# Patient Record
Sex: MALE | HISPANIC OR LATINO | URBAN - METROPOLITAN AREA
[De-identification: names, ages, dates, MRNs, and addresses within clinical notes are randomized per-mention and may not be internally consistent; named-entity substitution may affect disease eponyms.]

---

## 2017-03-01 ENCOUNTER — NEW PATIENT (OUTPATIENT)
Dept: URBAN - METROPOLITAN AREA CLINIC 24 | Facility: CLINIC | Age: 62
End: 2017-03-01
Payer: MEDICARE

## 2017-03-01 DIAGNOSIS — H25.813 COMBINED FORMS OF AGE-RELATED CATARACT, BILATERAL: ICD-10-CM

## 2017-03-01 DIAGNOSIS — E11.9 TYPE 2 DIABETES MELLITUS WITHOUT COMPLICATIONS: Primary | ICD-10-CM

## 2017-03-01 PROCEDURE — 92250 FUNDUS PHOTOGRAPHY W/I&R: CPT | Performed by: OPTOMETRIST

## 2017-03-01 PROCEDURE — 92004 COMPRE OPH EXAM NEW PT 1/>: CPT | Performed by: OPTOMETRIST

## 2017-03-01 ASSESSMENT — VISUAL ACUITY
OD: 20/20
OS: 20/20

## 2017-03-01 ASSESSMENT — KERATOMETRY
OS: 43.44
OD: 43.08

## 2017-03-01 ASSESSMENT — INTRAOCULAR PRESSURE
OS: 14
OD: 13

## 2018-05-24 ENCOUNTER — FOLLOW UP ESTABLISHED (OUTPATIENT)
Dept: URBAN - METROPOLITAN AREA CLINIC 24 | Facility: CLINIC | Age: 63
End: 2018-05-24
Payer: MEDICARE

## 2018-05-24 PROCEDURE — 92014 COMPRE OPH EXAM EST PT 1/>: CPT | Performed by: OPTOMETRIST

## 2018-05-24 PROCEDURE — 92250 FUNDUS PHOTOGRAPHY W/I&R: CPT | Performed by: OPTOMETRIST

## 2018-05-24 ASSESSMENT — KERATOMETRY
OS: 43.44
OD: 43.30

## 2018-05-24 ASSESSMENT — VISUAL ACUITY
OD: 20/20
OS: 20/20

## 2018-05-24 ASSESSMENT — INTRAOCULAR PRESSURE
OD: 14
OS: 14

## 2019-07-03 ENCOUNTER — FOLLOW UP ESTABLISHED (OUTPATIENT)
Dept: URBAN - METROPOLITAN AREA CLINIC 24 | Facility: CLINIC | Age: 64
End: 2019-07-03
Payer: MEDICARE

## 2019-07-03 PROCEDURE — 92014 COMPRE OPH EXAM EST PT 1/>: CPT | Performed by: OPTOMETRIST

## 2019-07-03 PROCEDURE — 92134 CPTRZ OPH DX IMG PST SGM RTA: CPT | Performed by: OPTOMETRIST

## 2019-07-03 ASSESSMENT — VISUAL ACUITY
OD: 20/20
OS: 20/20

## 2019-07-03 ASSESSMENT — INTRAOCULAR PRESSURE
OD: 15
OS: 14

## 2020-07-06 ENCOUNTER — FOLLOW UP ESTABLISHED (OUTPATIENT)
Dept: URBAN - METROPOLITAN AREA CLINIC 24 | Facility: CLINIC | Age: 65
End: 2020-07-06
Payer: MEDICARE

## 2020-07-06 DIAGNOSIS — H43.393 OTHER VITREOUS OPACITIES, BILATERAL: ICD-10-CM

## 2020-07-06 DIAGNOSIS — Z79.4 LONG TERM (CURRENT) USE OF INSULIN: ICD-10-CM

## 2020-07-06 PROCEDURE — 92014 COMPRE OPH EXAM EST PT 1/>: CPT | Performed by: OPTOMETRIST

## 2020-07-06 PROCEDURE — 92134 CPTRZ OPH DX IMG PST SGM RTA: CPT | Performed by: OPTOMETRIST

## 2020-07-06 ASSESSMENT — VISUAL ACUITY
OD: 20/20
OS: 20/20

## 2020-07-06 ASSESSMENT — KERATOMETRY: OD: 42.91

## 2020-07-06 ASSESSMENT — INTRAOCULAR PRESSURE
OS: 15
OD: 15

## 2021-07-12 ENCOUNTER — OFFICE VISIT (OUTPATIENT)
Dept: URBAN - METROPOLITAN AREA CLINIC 24 | Facility: CLINIC | Age: 66
End: 2021-07-12
Payer: MEDICARE

## 2021-07-12 PROCEDURE — 92014 COMPRE OPH EXAM EST PT 1/>: CPT | Performed by: OPTOMETRIST

## 2021-07-12 ASSESSMENT — KERATOMETRY
OD: 43.11
OS: 43.47

## 2021-07-12 ASSESSMENT — VISUAL ACUITY
OS: 20/20
OD: 20/20

## 2021-07-12 ASSESSMENT — INTRAOCULAR PRESSURE
OD: 15
OS: 14

## 2021-07-12 NOTE — IMPRESSION/PLAN
Impression: Other vitreous opacities, bilateral Plan: Warning signs of retinal tear and detachment discussed with patient. To return to clinic STAT if any change in symptoms consistent with RT or RD.

## 2022-02-15 ENCOUNTER — OFFICE VISIT (OUTPATIENT)
Dept: URBAN - METROPOLITAN AREA CLINIC 24 | Facility: CLINIC | Age: 67
End: 2022-02-15
Payer: MEDICARE

## 2022-02-15 DIAGNOSIS — H20.012 PRIMARY IRIDOCYCLITIS, LEFT EYE: Primary | ICD-10-CM

## 2022-02-15 PROCEDURE — 99214 OFFICE O/P EST MOD 30 MIN: CPT | Performed by: OPTOMETRIST

## 2022-02-15 RX ORDER — CYCLOPENTOLATE HYDROCHLORIDE 20 MG/ML
2 % SOLUTION/ DROPS OPHTHALMIC
Qty: 5 | Refills: 1 | Status: INACTIVE
Start: 2022-02-15 | End: 2022-02-15

## 2022-02-15 RX ORDER — PREDNISOLONE ACETATE 10 MG/ML
1 % SUSPENSION/ DROPS OPHTHALMIC
Qty: 5 | Refills: 2 | Status: ACTIVE
Start: 2022-02-15

## 2022-02-15 ASSESSMENT — INTRAOCULAR PRESSURE
OS: 13
OD: 16

## 2022-02-15 NOTE — IMPRESSION/PLAN
Impression: Primary iridocyclitis, left eye: H20.012.
-- onset ~ 10 days; idiopathic.  first known episode Plan: Start pred acetate 1% qid os, cyclogel 2% bid os. 1gtt in office today See for f/u 1 week; immediately if s/sx worsen.

## 2022-02-25 ENCOUNTER — OFFICE VISIT (OUTPATIENT)
Dept: URBAN - METROPOLITAN AREA CLINIC 24 | Facility: CLINIC | Age: 67
End: 2022-02-25
Payer: MEDICARE

## 2022-02-25 PROCEDURE — 99213 OFFICE O/P EST LOW 20 MIN: CPT | Performed by: OPTOMETRIST

## 2022-02-25 ASSESSMENT — INTRAOCULAR PRESSURE
OS: 10
OD: 13

## 2022-02-25 NOTE — IMPRESSION/PLAN
Impression: Primary iridocyclitis, left eye: H20.012.
-- onset ~ 17 days; idiopathic.  first known episode Plan: Improved. Start pred acetate OD taper; use tid x 1 week, then taper weekly. Notify clinic if symptoms recur.

## 2022-02-25 NOTE — IMPRESSION/PLAN
Impression: Posterior synechiae (iris), left eye: H21.542. Plan: Unable to fill cyclopentolate. Synechiae resolved. Good IOP. Defer cycloplegic at this point.

## 2022-04-07 ENCOUNTER — OFFICE VISIT (OUTPATIENT)
Dept: URBAN - METROPOLITAN AREA CLINIC 24 | Facility: CLINIC | Age: 67
End: 2022-04-07
Payer: MEDICARE

## 2022-04-07 DIAGNOSIS — H21.542 POSTERIOR SYNECHIAE (IRIS), LEFT EYE: ICD-10-CM

## 2022-04-07 PROCEDURE — 99213 OFFICE O/P EST LOW 20 MIN: CPT | Performed by: OPTOMETRIST

## 2022-04-07 ASSESSMENT — INTRAOCULAR PRESSURE
OS: 10
OD: 13

## 2022-04-07 NOTE — IMPRESSION/PLAN
Impression: Primary iridocyclitis, left eye: H20.012.
-- onset ~ 2 months. idiopathic.  first known episode, non-granulomateous Plan: Improved. Continue pred acetate OS taper; use qam x 2 weeks; then d/c. Notify clinic if symptoms recur.

## 2022-04-07 NOTE — IMPRESSION/PLAN
Impression: Other vitreous opacities, bilateral: H43.393. Plan: There is no evidence of retinal pathology. All signs and risks of retinal detachment or tears were discussed in detail. If pt. notices any symptoms discussed, contact office ASAP. Recommend pt. return for normal recall.

## 2022-08-03 ENCOUNTER — OFFICE VISIT (OUTPATIENT)
Dept: URBAN - METROPOLITAN AREA CLINIC 24 | Facility: CLINIC | Age: 67
End: 2022-08-03
Payer: MEDICARE

## 2022-08-03 DIAGNOSIS — Z79.4 LONG TERM (CURRENT) USE OF INSULIN: ICD-10-CM

## 2022-08-03 DIAGNOSIS — E11.9 TYPE 2 DIABETES MELLITUS WITHOUT COMPLICATIONS: ICD-10-CM

## 2022-08-03 DIAGNOSIS — H20.013 PRIMARY IRIDOCYCLITIS, BILATERAL: ICD-10-CM

## 2022-08-03 DIAGNOSIS — H25.813 COMBINED FORMS OF AGE-RELATED CATARACT, BILATERAL: Primary | ICD-10-CM

## 2022-08-03 PROCEDURE — 99204 OFFICE O/P NEW MOD 45 MIN: CPT | Performed by: OPHTHALMOLOGY

## 2022-08-03 ASSESSMENT — VISUAL ACUITY
OD: 20/25
OS: 20/30

## 2022-08-03 ASSESSMENT — KERATOMETRY
OS: 43.58
OD: 43.35

## 2022-08-03 ASSESSMENT — INTRAOCULAR PRESSURE
OD: 6
OS: 9

## 2022-08-03 NOTE — IMPRESSION/PLAN
Impression: Type 2 diabetes mellitus without complications: Y01.6. Plan: Discussed good blood sugar and blood pressure - diet, exercise, and nutritional control emphasized to reduce future risk of diabetic complications. Maintain follow up with PCP.

## 2022-08-03 NOTE — IMPRESSION/PLAN
Impression: Combined forms of age-related cataract, bilateral: H25.813. Plan: Patient will continue to monitor vision. Patient will return for CEE if notes any sudden changes in vision or loss of vision.

## 2022-08-03 NOTE — IMPRESSION/PLAN
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 Plan: Improved OU. 
RTC in 1 month to see Dr. Kalyan Cr

## 2022-09-15 ENCOUNTER — OFFICE VISIT (OUTPATIENT)
Dept: URBAN - METROPOLITAN AREA CLINIC 30 | Facility: CLINIC | Age: 67
End: 2022-09-15
Payer: MEDICARE

## 2022-09-15 DIAGNOSIS — H20.013 PRIMARY IRIDOCYCLITIS, BILATERAL: Primary | ICD-10-CM

## 2022-09-15 PROCEDURE — 99214 OFFICE O/P EST MOD 30 MIN: CPT | Performed by: OPHTHALMOLOGY

## 2022-09-15 PROCEDURE — 92134 CPTRZ OPH DX IMG PST SGM RTA: CPT | Performed by: OPHTHALMOLOGY

## 2022-09-15 RX ORDER — PREDNISOLONE ACETATE 10 MG/ML
1 % SUSPENSION/ DROPS OPHTHALMIC
Qty: 10 | Refills: 1 | Status: ACTIVE
Start: 2022-09-15

## 2022-09-15 ASSESSMENT — INTRAOCULAR PRESSURE
OD: 11
OS: 13

## 2022-09-15 NOTE — IMPRESSION/PLAN
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 Plan: Nongranulamatous anterior uveitis with CME OU - start PF QID OU. Follow-up in 4-6 weeks for exam, sooner PRN Systemic work-up required to rule out underlying autoimmune or infectious cause, refer to PCP for Labs: CBC, CMP Infectious: RPR/FTA, Q-gold or PPD Autoimmune labs: MATTHEW, ACE, RF, HLA-B27, Imaging: chest x-ray

## 2022-10-11 ENCOUNTER — OFFICE VISIT (OUTPATIENT)
Dept: URBAN - METROPOLITAN AREA CLINIC 24 | Facility: CLINIC | Age: 67
End: 2022-10-11
Payer: MEDICARE

## 2022-10-11 DIAGNOSIS — H20.013 PRIMARY IRIDOCYCLITIS, BILATERAL: Primary | ICD-10-CM

## 2022-10-11 PROCEDURE — 92134 CPTRZ OPH DX IMG PST SGM RTA: CPT | Performed by: OPHTHALMOLOGY

## 2022-10-11 PROCEDURE — 92014 COMPRE OPH EXAM EST PT 1/>: CPT | Performed by: OPHTHALMOLOGY

## 2022-10-11 RX ORDER — PREDNISOLONE ACETATE 10 MG/ML
1 % SUSPENSION/ DROPS OPHTHALMIC
Qty: 10 | Refills: 1 | Status: ACTIVE
Start: 2022-10-11

## 2022-10-11 ASSESSMENT — INTRAOCULAR PRESSURE
OS: 10
OD: 9

## 2022-10-11 NOTE — IMPRESSION/PLAN
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 Plan: Nongranulamatous anterior uveitis with CME OU - resolving, continue PF QID OU. Follow-up in 4-6 weeks for exam, sooner PRN Systemic work-up required to rule out underlying autoimmune or infectious cause, refer to PCP for Labs: CBC, CMP Infectious: RPR/FTA, Q-gold or PPD Autoimmune labs: MATTHEW, ACE, RF, HLA-B27, Imaging: chest x-ray

## 2022-11-22 ENCOUNTER — OFFICE VISIT (OUTPATIENT)
Dept: URBAN - METROPOLITAN AREA CLINIC 24 | Facility: CLINIC | Age: 67
End: 2022-11-22
Payer: MEDICARE

## 2022-11-22 DIAGNOSIS — H20.013 PRIMARY IRIDOCYCLITIS, BILATERAL: Primary | ICD-10-CM

## 2022-11-22 PROCEDURE — 92014 COMPRE OPH EXAM EST PT 1/>: CPT | Performed by: OPHTHALMOLOGY

## 2022-11-22 PROCEDURE — 92134 CPTRZ OPH DX IMG PST SGM RTA: CPT | Performed by: OPHTHALMOLOGY

## 2022-11-22 RX ORDER — PREDNISOLONE ACETATE 10 MG/ML
1 % SUSPENSION/ DROPS OPHTHALMIC
Qty: 10 | Refills: 2 | Status: ACTIVE
Start: 2022-11-22

## 2022-11-22 ASSESSMENT — INTRAOCULAR PRESSURE
OD: 16
OS: 16

## 2022-11-22 NOTE — IMPRESSION/PLAN
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 Plan: Nongranulamatous anterior uveitis with CME OU - resolved, taper to PF TID OU x 1 month and then BID OU until follow-up in 4-6 weeks for exam, sooner PRN Systemic work-up was normal including:

Labs: CBC, CMP Infectious: RPR/FTA, Q-gold or PPD Autoimmune labs: MATTHEW, ACE, RF, HLA-B27 Imaging: chest x-ray

## 2023-01-17 ENCOUNTER — OFFICE VISIT (OUTPATIENT)
Dept: URBAN - METROPOLITAN AREA CLINIC 24 | Facility: CLINIC | Age: 68
End: 2023-01-17
Payer: MEDICARE

## 2023-01-17 DIAGNOSIS — H20.013 PRIMARY IRIDOCYCLITIS, BILATERAL: Primary | ICD-10-CM

## 2023-01-17 PROCEDURE — 92134 CPTRZ OPH DX IMG PST SGM RTA: CPT | Performed by: OPHTHALMOLOGY

## 2023-01-17 PROCEDURE — 92014 COMPRE OPH EXAM EST PT 1/>: CPT | Performed by: OPHTHALMOLOGY

## 2023-01-17 RX ORDER — PREDNISOLONE ACETATE 10 MG/ML
1 % SUSPENSION/ DROPS OPHTHALMIC
Qty: 10 | Refills: 4 | Status: ACTIVE
Start: 2023-01-17

## 2023-01-17 ASSESSMENT — INTRAOCULAR PRESSURE
OS: 13
OD: 17

## 2023-01-17 NOTE — IMPRESSION/PLAN
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 Plan: Nongranulamatous anterior uveitis with CME OU - resolved, taper to PF BID OU x 2 weeks and then daily OU until follow-up in 4-6 weeks for exam, sooner PRN Systemic work-up was normal including:

Labs: CBC, CMP Infectious: RPR/FTA, Q-gold or PPD Autoimmune labs: MATTHEW, ACE, RF, HLA-B27 Imaging: chest x-ray

## 2023-04-03 ENCOUNTER — OFFICE VISIT (OUTPATIENT)
Dept: URBAN - METROPOLITAN AREA CLINIC 24 | Facility: CLINIC | Age: 68
End: 2023-04-03
Payer: MEDICARE

## 2023-04-03 DIAGNOSIS — H20.013 PRIMARY IRIDOCYCLITIS, BILATERAL: Primary | ICD-10-CM

## 2023-04-03 PROCEDURE — 92014 COMPRE OPH EXAM EST PT 1/>: CPT | Performed by: OPHTHALMOLOGY

## 2023-04-03 PROCEDURE — 92134 CPTRZ OPH DX IMG PST SGM RTA: CPT | Performed by: OPHTHALMOLOGY

## 2023-04-03 RX ORDER — PREDNISOLONE ACETATE 10 MG/ML
1 % SUSPENSION/ DROPS OPHTHALMIC
Qty: 10 | Refills: 1 | Status: ACTIVE
Start: 2023-04-03

## 2023-04-03 ASSESSMENT — INTRAOCULAR PRESSURE
OD: 13
OS: 15

## 2023-04-03 NOTE — IMPRESSION/PLAN
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 Plan: Nongranulamatous anterior uveitis with CME OU - resolved, taper to PF daily OU until follow-up in 4-6 weeks for exam, sooner PRN Systemic work-up was normal including:

Labs: CBC, CMP Infectious: RPR/FTA, Q-gold or PPD Autoimmune labs: MATTHEW, ACE, RF, HLA-B27 Imaging: chest x-ray

## 2023-05-15 ENCOUNTER — OFFICE VISIT (OUTPATIENT)
Dept: URBAN - METROPOLITAN AREA CLINIC 24 | Facility: CLINIC | Age: 68
End: 2023-05-15
Payer: MEDICARE

## 2023-05-15 DIAGNOSIS — H20.013 PRIMARY IRIDOCYCLITIS, BILATERAL: Primary | ICD-10-CM

## 2023-05-15 PROCEDURE — 92134 CPTRZ OPH DX IMG PST SGM RTA: CPT | Performed by: OPHTHALMOLOGY

## 2023-05-15 PROCEDURE — 99214 OFFICE O/P EST MOD 30 MIN: CPT | Performed by: OPHTHALMOLOGY

## 2023-05-15 ASSESSMENT — INTRAOCULAR PRESSURE
OD: 15
OS: 16

## 2023-05-15 NOTE — IMPRESSION/PLAN
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 Plan: Nongranulamatous anterior uveitis with CME OU - resolved, DC PF and follow-up in 6-8 weeks for exam, sooner PRN Systemic work-up was normal including:

Labs: CBC, CMP Infectious: RPR/FTA, Q-gold or PPD Autoimmune labs: MATTHEW, ACE, RF, HLA-B27 Imaging: chest x-ray

## 2023-07-18 ENCOUNTER — OFFICE VISIT (OUTPATIENT)
Dept: URBAN - METROPOLITAN AREA CLINIC 24 | Facility: CLINIC | Age: 68
End: 2023-07-18
Payer: MEDICARE

## 2023-07-18 DIAGNOSIS — H20.013 PRIMARY IRIDOCYCLITIS, BILATERAL: Primary | ICD-10-CM

## 2023-07-18 PROCEDURE — 99214 OFFICE O/P EST MOD 30 MIN: CPT | Performed by: OPHTHALMOLOGY

## 2023-07-18 PROCEDURE — 92134 CPTRZ OPH DX IMG PST SGM RTA: CPT | Performed by: OPHTHALMOLOGY

## 2023-07-18 ASSESSMENT — INTRAOCULAR PRESSURE
OD: 16
OS: 16

## 2023-07-18 NOTE — IMPRESSION/PLAN
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 Plan: Nongranulamatous anterior uveitis with CME OU - resolved OD and stable off PF. CME recurred OS, restart PF BID OS. Follow-up in 6-8 weeks for exam, sooner PRN Systemic work-up was normal including:

Labs: CBC, CMP Infectious: RPR/FTA, Q-gold or PPD Autoimmune labs: MATTHEW, ACE, RF, HLA-B27 Imaging: chest x-ray

## 2023-08-29 ENCOUNTER — OFFICE VISIT (OUTPATIENT)
Dept: URBAN - METROPOLITAN AREA CLINIC 24 | Facility: CLINIC | Age: 68
End: 2023-08-29
Payer: MEDICARE

## 2023-08-29 DIAGNOSIS — H20.013 PRIMARY IRIDOCYCLITIS, BILATERAL: Primary | ICD-10-CM

## 2023-08-29 PROCEDURE — 92134 CPTRZ OPH DX IMG PST SGM RTA: CPT | Performed by: OPHTHALMOLOGY

## 2023-08-29 PROCEDURE — 99214 OFFICE O/P EST MOD 30 MIN: CPT | Performed by: OPHTHALMOLOGY

## 2023-08-29 RX ORDER — PREDNISOLONE ACETATE 10 MG/ML
1 % SUSPENSION/ DROPS OPHTHALMIC
Qty: 10 | Refills: 4 | Status: ACTIVE
Start: 2023-08-29

## 2023-08-29 ASSESSMENT — INTRAOCULAR PRESSURE
OS: 16
OD: 12

## 2023-10-24 ENCOUNTER — OFFICE VISIT (OUTPATIENT)
Dept: URBAN - METROPOLITAN AREA CLINIC 24 | Facility: CLINIC | Age: 68
End: 2023-10-24
Payer: MEDICARE

## 2023-10-24 DIAGNOSIS — H20.013 PRIMARY IRIDOCYCLITIS, BILATERAL: Primary | ICD-10-CM

## 2023-10-24 PROCEDURE — 92134 CPTRZ OPH DX IMG PST SGM RTA: CPT | Performed by: OPHTHALMOLOGY

## 2023-10-24 PROCEDURE — 99214 OFFICE O/P EST MOD 30 MIN: CPT | Performed by: OPHTHALMOLOGY

## 2023-10-24 ASSESSMENT — INTRAOCULAR PRESSURE
OS: 16
OD: 11

## 2023-12-05 ENCOUNTER — OFFICE VISIT (OUTPATIENT)
Dept: URBAN - METROPOLITAN AREA CLINIC 24 | Facility: CLINIC | Age: 68
End: 2023-12-05
Payer: MEDICARE

## 2023-12-05 DIAGNOSIS — H20.013 PRIMARY IRIDOCYCLITIS, BILATERAL: Primary | ICD-10-CM

## 2023-12-05 PROCEDURE — 92134 CPTRZ OPH DX IMG PST SGM RTA: CPT | Performed by: OPHTHALMOLOGY

## 2023-12-05 PROCEDURE — 99214 OFFICE O/P EST MOD 30 MIN: CPT | Performed by: OPHTHALMOLOGY

## 2023-12-05 RX ORDER — PREDNISOLONE ACETATE 10 MG/ML
1 % SUSPENSION/ DROPS OPHTHALMIC
Qty: 10 | Refills: 2 | Status: ACTIVE
Start: 2023-12-05

## 2023-12-05 ASSESSMENT — INTRAOCULAR PRESSURE
OS: 11
OD: 11

## 2024-02-05 ENCOUNTER — OFFICE VISIT (OUTPATIENT)
Dept: URBAN - METROPOLITAN AREA CLINIC 24 | Facility: CLINIC | Age: 69
End: 2024-02-05
Payer: MEDICARE

## 2024-02-05 DIAGNOSIS — H25.813 COMBINED FORMS OF AGE-RELATED CATARACT, BILATERAL: ICD-10-CM

## 2024-02-05 PROCEDURE — 92134 CPTRZ OPH DX IMG PST SGM RTA: CPT | Performed by: OPHTHALMOLOGY

## 2024-02-05 PROCEDURE — 99214 OFFICE O/P EST MOD 30 MIN: CPT | Performed by: OPHTHALMOLOGY

## 2024-02-05 ASSESSMENT — INTRAOCULAR PRESSURE
OS: 19
OD: 14

## 2024-06-04 ENCOUNTER — OFFICE VISIT (OUTPATIENT)
Dept: URBAN - METROPOLITAN AREA CLINIC 24 | Facility: CLINIC | Age: 69
End: 2024-06-04
Payer: MEDICARE

## 2024-06-04 DIAGNOSIS — H20.013 PRIMARY IRIDOCYCLITIS, BILATERAL: Primary | ICD-10-CM

## 2024-06-04 DIAGNOSIS — H25.813 COMBINED FORMS OF AGE-RELATED CATARACT, BILATERAL: ICD-10-CM

## 2024-06-04 PROCEDURE — 92134 CPTRZ OPH DX IMG PST SGM RTA: CPT | Performed by: OPHTHALMOLOGY

## 2024-06-04 PROCEDURE — 99214 OFFICE O/P EST MOD 30 MIN: CPT | Performed by: OPHTHALMOLOGY

## 2024-06-04 ASSESSMENT — INTRAOCULAR PRESSURE
OS: 17
OD: 16

## 2024-12-09 ENCOUNTER — OFFICE VISIT (OUTPATIENT)
Dept: URBAN - METROPOLITAN AREA CLINIC 24 | Facility: CLINIC | Age: 69
End: 2024-12-09
Payer: MEDICARE

## 2024-12-09 DIAGNOSIS — H20.013 PRIMARY IRIDOCYCLITIS, BILATERAL: Primary | ICD-10-CM

## 2024-12-09 DIAGNOSIS — H25.813 COMBINED FORMS OF AGE-RELATED CATARACT, BILATERAL: ICD-10-CM

## 2024-12-09 PROCEDURE — 92134 CPTRZ OPH DX IMG PST SGM RTA: CPT | Performed by: OPHTHALMOLOGY

## 2024-12-09 PROCEDURE — 99214 OFFICE O/P EST MOD 30 MIN: CPT | Performed by: OPHTHALMOLOGY

## 2024-12-09 ASSESSMENT — INTRAOCULAR PRESSURE
OS: 14
OD: 14

## 2025-08-15 ENCOUNTER — OFFICE VISIT (OUTPATIENT)
Dept: URBAN - METROPOLITAN AREA CLINIC 24 | Facility: CLINIC | Age: 70
End: 2025-08-15
Payer: MEDICARE

## 2025-08-15 DIAGNOSIS — H25.813 COMBINED FORMS OF AGE-RELATED CATARACT, BILATERAL: ICD-10-CM

## 2025-08-15 DIAGNOSIS — H20.013 PRIMARY IRIDOCYCLITIS, BILATERAL: ICD-10-CM

## 2025-08-15 DIAGNOSIS — H40.1134 PRIMARY OPEN-ANGLE GLAUCOMA, INDETERMINATE, BILATERAL: Primary | ICD-10-CM

## 2025-08-15 PROCEDURE — 99204 OFFICE O/P NEW MOD 45 MIN: CPT | Performed by: STUDENT IN AN ORGANIZED HEALTH CARE EDUCATION/TRAINING PROGRAM

## 2025-08-15 PROCEDURE — 92250 FUNDUS PHOTOGRAPHY W/I&R: CPT | Performed by: STUDENT IN AN ORGANIZED HEALTH CARE EDUCATION/TRAINING PROGRAM

## 2025-08-15 PROCEDURE — 92020 GONIOSCOPY: CPT | Performed by: STUDENT IN AN ORGANIZED HEALTH CARE EDUCATION/TRAINING PROGRAM

## 2025-08-15 RX ORDER — DORZOLAMIDE HCL 20 MG/ML
2 % SOLUTION/ DROPS OPHTHALMIC
Qty: 15 | Refills: 1 | Status: ACTIVE
Start: 2025-08-15

## 2025-08-15 RX ORDER — BRIMONIDINE TARTRATE 2 MG/ML
0.2 % SOLUTION/ DROPS OPHTHALMIC
Qty: 15 | Refills: 4 | Status: ACTIVE
Start: 2025-08-15

## 2025-08-15 ASSESSMENT — INTRAOCULAR PRESSURE
OD: 34
OS: 42
OS: 34
OD: 38

## 2025-08-25 ENCOUNTER — OFFICE VISIT (OUTPATIENT)
Dept: URBAN - METROPOLITAN AREA CLINIC 24 | Facility: CLINIC | Age: 70
End: 2025-08-25
Payer: COMMERCIAL

## 2025-08-25 DIAGNOSIS — H40.1134 PRIMARY OPEN-ANGLE GLAUCOMA, INDETERMINATE, BILATERAL: Primary | ICD-10-CM

## 2025-08-25 PROCEDURE — 99213 OFFICE O/P EST LOW 20 MIN: CPT | Performed by: STUDENT IN AN ORGANIZED HEALTH CARE EDUCATION/TRAINING PROGRAM

## 2025-08-25 ASSESSMENT — INTRAOCULAR PRESSURE
OS: 16
OD: 14